# Patient Record
Sex: FEMALE | Race: WHITE
[De-identification: names, ages, dates, MRNs, and addresses within clinical notes are randomized per-mention and may not be internally consistent; named-entity substitution may affect disease eponyms.]

---

## 2020-01-02 ENCOUNTER — RX ONLY (RX ONLY)
Age: 60
End: 2020-01-02

## 2020-01-02 RX ORDER — FLUOCINOLONE ACETONIDE 0.11 MG/ML
OIL TOPICAL
Qty: 6 | Refills: 0 | Status: ERX | COMMUNITY
Start: 2020-01-02

## 2020-01-02 RX ORDER — FLUTICASONE PROPIONATE 0.5 MG/G
CREAM TOPICAL
Qty: 1 | Refills: 0 | Status: ERX | COMMUNITY
Start: 2020-01-02

## 2020-01-06 ENCOUNTER — RX ONLY (RX ONLY)
Age: 60
End: 2020-01-06

## 2020-01-06 RX ORDER — FLUOCINOLONE ACETONIDE 0.11 MG/ML
OIL TOPICAL
Qty: 6 | Refills: 0 | Status: ERX

## 2020-05-04 ENCOUNTER — RX ONLY (RX ONLY)
Age: 60
End: 2020-05-04

## 2020-05-04 RX ORDER — FLUOCINOLONE ACETONIDE 0.11 MG/ML
OIL TOPICAL
Qty: 1 | Refills: 2 | Status: ERX

## 2020-05-08 ENCOUNTER — RX ONLY (RX ONLY)
Age: 60
End: 2020-05-08

## 2020-05-08 RX ORDER — FLUOCINOLONE ACETONIDE 0.11 MG/ML
OIL TOPICAL
Qty: 1 | Refills: 1 | Status: ERX

## 2020-05-21 ENCOUNTER — APPOINTMENT (OUTPATIENT)
Dept: URBAN - METROPOLITAN AREA CLINIC 232 | Age: 60
Setting detail: DERMATOLOGY
End: 2020-05-21

## 2020-05-21 DIAGNOSIS — L40.0 PSORIASIS VULGARIS: ICD-10-CM

## 2020-05-21 DIAGNOSIS — L82.1 OTHER SEBORRHEIC KERATOSIS: ICD-10-CM

## 2020-05-21 PROCEDURE — OTHER COUNSELING: OTHER

## 2020-05-21 PROCEDURE — OTHER ADDITIONAL NOTES: OTHER

## 2020-05-21 PROCEDURE — 99203 OFFICE O/P NEW LOW 30 MIN: CPT

## 2020-05-21 PROCEDURE — OTHER PRESCRIPTION: OTHER

## 2020-05-21 RX ORDER — FLUOCINOLONE ACETONIDE 0.11 MG/ML
OIL TOPICAL
Qty: 1 | Refills: 3 | Status: ERX

## 2020-05-21 ASSESSMENT — LOCATION SIMPLE DESCRIPTION DERM: LOCATION SIMPLE: RIGHT UPPER BACK

## 2020-05-21 ASSESSMENT — LOCATION ZONE DERM: LOCATION ZONE: TRUNK

## 2020-05-21 ASSESSMENT — LOCATION DETAILED DESCRIPTION DERM: LOCATION DETAILED: RIGHT MID-UPPER BACK

## 2020-05-21 NOTE — PROCEDURE: ADDITIONAL NOTES
Detail Level: Simple
Additional Notes: MTX IS given and discussed.  She is aware she can call if she would like to pursue.  She has no obvious contraindication.

## 2020-10-23 ENCOUNTER — RX ONLY (RX ONLY)
Age: 60
End: 2020-10-23

## 2020-10-23 RX ORDER — FLUTICASONE PROPIONATE 0.5 MG/G
CREAM TOPICAL
Qty: 1 | Refills: 0 | Status: ERX

## 2021-06-14 ENCOUNTER — APPOINTMENT (OUTPATIENT)
Dept: URBAN - METROPOLITAN AREA CLINIC 232 | Age: 61
Setting detail: DERMATOLOGY
End: 2021-06-15

## 2021-06-14 DIAGNOSIS — L40.0 PSORIASIS VULGARIS: ICD-10-CM

## 2021-06-14 PROCEDURE — 99214 OFFICE O/P EST MOD 30 MIN: CPT

## 2021-06-14 PROCEDURE — OTHER COUNSELING: OTHER

## 2021-06-14 PROCEDURE — OTHER MIPS QUALITY: OTHER

## 2021-06-14 PROCEDURE — OTHER ORDER TESTS: OTHER

## 2021-06-14 PROCEDURE — OTHER PRESCRIPTION: OTHER

## 2021-06-14 PROCEDURE — OTHER PATIENT SPECIFIC COUNSELING: OTHER

## 2021-06-14 PROCEDURE — OTHER TREATMENT REGIMEN: OTHER

## 2021-06-14 PROCEDURE — OTHER METHOTREXATE INITIATION: OTHER

## 2021-06-14 RX ORDER — METHOTREXATE SODIUM 2.5 MG/1
TABLET ORAL
Qty: 12 | Refills: 3 | Status: ERX | COMMUNITY
Start: 2021-06-14

## 2021-06-14 RX ORDER — BETAMETHASONE DIPROPIONATE 0.5 MG/ML
LOTION, AUGMENTED TOPICAL
Qty: 3 | Refills: 3 | Status: ERX | COMMUNITY
Start: 2021-06-14

## 2021-06-14 RX ORDER — FLUOCINOLONE ACETONIDE 0.11 MG/ML
OIL TOPICAL
Qty: 6 | Refills: 3 | Status: ERX | COMMUNITY
Start: 2021-06-14

## 2021-06-14 RX ORDER — FOLIC ACID 1 MG/1
TABLET ORAL
Qty: 30 | Refills: 11 | Status: ERX | COMMUNITY
Start: 2021-06-14

## 2021-06-14 ASSESSMENT — PGA PSORIASIS: PGA PSORIASIS 2020: MODERATE

## 2021-06-14 ASSESSMENT — BSA PSORIASIS: % BODY COVERED IN PSORIASIS: 8

## 2021-06-14 NOTE — PROCEDURE: TREATMENT REGIMEN
Continue Regimen: fluocinolone 0.01 % topical body oil \\nApply to scalp trunk and extremities daily for two weeks
Action 4: Continue
Detail Level: Zone
Start Regimen: betamethasone, augmented 0.05 % lotion \\nApply to ears trunk and extremities bid for two weeks\\n\\nmethotrexate sodium 2.5 mg tablet \\nTake 3 tabs once a week\\n\\nfolic acid 1 mg tablet \\nTake one tab daily on days that methotrexate is not taken

## 2021-06-14 NOTE — PROCEDURE: METHOTREXATE INITIATION
Initial Cbc (Optional): within normal limits Oct 2020
Initial Lfts (Optional): 21/22 Oct 2020
Detail Level: Zone

## 2021-06-14 NOTE — PROCEDURE: PATIENT SPECIFIC COUNSELING
Detail Level: Detailed
Pt will be a bit more aggressive with topical steroids in the next 2 weeks. Methotrexate information sheet given and discussed. Pt is very frustrated with her psoriasis as is has not responded well to previous topical steroid regimen. She is significant pruritis. The plaques cover most of her scalp, ears and spillover on to her forehead. She has had patches on her eyelids and uses fluticasone prn. She denies any liver issues, she rarely drinks alcohol, no h/o cancer or kidney disease. Recent CMP and CBC were WNL. Will have pt get labs done after 4 doses of methotrexate. If WNL and no side effects, will increase dose to 6 tabs weekly. Pt seems to understand importance of Folic Acid use as well. Pt reports she has joint pain but unclear of actual joint swelling. She has not had eval by Rheum and not given a dx for PsA at this time. She will take note of joint pain status while on MTX and try to d/c voltaren if doing well.

## 2021-07-20 ENCOUNTER — APPOINTMENT (OUTPATIENT)
Dept: URBAN - METROPOLITAN AREA CLINIC 232 | Age: 61
Setting detail: DERMATOLOGY
End: 2021-07-21

## 2021-07-20 DIAGNOSIS — L40.0 PSORIASIS VULGARIS: ICD-10-CM

## 2021-07-20 PROCEDURE — OTHER ORDER TESTS: OTHER

## 2021-07-20 PROCEDURE — OTHER PRESCRIPTION: OTHER

## 2021-07-20 PROCEDURE — OTHER METHOTREXATE MONITORING: OTHER

## 2021-07-20 RX ORDER — METHOTREXATE SODIUM 2.5 MG/1
TABLET ORAL
Qty: 24 | Refills: 5 | Status: ERX | COMMUNITY
Start: 2021-07-20

## 2021-07-20 NOTE — PROCEDURE: METHOTREXATE MONITORING
Comments: Will increase MTX to 6 tabs (15mg) q week then check labs again in 1 month. Labslips for CBC and LFTs was mailed to pt's home address to get another draw in 1 month. She will follow up in 2 months.
Add High Risk Medication Management Associated Diagnosis?: Yes
Current Dosage (Optional): 7.5mg/week
Most Recent Cbc (Optional): WNL 7/12/21
Length Of Therapy (Optional): 1 month
Detail Level: Zone

## 2021-09-15 ENCOUNTER — APPOINTMENT (OUTPATIENT)
Dept: URBAN - METROPOLITAN AREA CLINIC 232 | Age: 61
Setting detail: DERMATOLOGY
End: 2021-09-15

## 2021-09-15 DIAGNOSIS — L40.0 PSORIASIS VULGARIS: ICD-10-CM

## 2021-09-15 PROCEDURE — OTHER METHOTREXATE MONITORING: OTHER

## 2021-09-15 PROCEDURE — 99213 OFFICE O/P EST LOW 20 MIN: CPT

## 2021-09-15 PROCEDURE — OTHER TREATMENT REGIMEN: OTHER

## 2021-09-15 PROCEDURE — OTHER PRESCRIPTION: OTHER

## 2021-09-15 PROCEDURE — OTHER ORDER TESTS: OTHER

## 2021-09-15 PROCEDURE — OTHER MIPS QUALITY: OTHER

## 2021-09-15 RX ORDER — METHOTREXATE SODIUM 2.5 MG/1
TABLET ORAL
Qty: 72 | Refills: 3 | Status: ERX

## 2021-09-15 ASSESSMENT — LOCATION ZONE DERM: LOCATION ZONE: SCALP

## 2021-09-15 ASSESSMENT — LOCATION SIMPLE DESCRIPTION DERM: LOCATION SIMPLE: SCALP

## 2021-09-15 ASSESSMENT — LOCATION DETAILED DESCRIPTION DERM: LOCATION DETAILED: LEFT CENTRAL FRONTAL SCALP

## 2021-09-15 NOTE — PROCEDURE: TREATMENT REGIMEN
Detail Level: Zone
Continue Regimen: methotrexate sodium 2.5 mg tablet (6tabs) once weekly, taper as tolerated to lowest dose without flaring and lowest side effects\\nFolic acid 1mg daily ( on days not taking methotrexate)\\nAug betamethasone lotion prn
Action 2: Continue
Other Instructions: Get labs done Dec or Ethan

## 2021-09-15 NOTE — PROCEDURE: METHOTREXATE MONITORING
Detail Level: Zone
Most Recent Cbc (Optional): WNL 8/24/21
Add High Risk Medication Management Associated Diagnosis?: Yes
Length Of Therapy (Optional): 3 month
Current Dosage (Optional): 15mg/week

## 2022-01-12 ENCOUNTER — APPOINTMENT (OUTPATIENT)
Dept: URBAN - METROPOLITAN AREA CLINIC 232 | Age: 62
Setting detail: DERMATOLOGY
End: 2022-01-13

## 2022-01-12 DIAGNOSIS — L40.0 PSORIASIS VULGARIS: ICD-10-CM

## 2022-01-12 PROCEDURE — OTHER ORDER TESTS: OTHER

## 2022-01-12 PROCEDURE — OTHER METHOTREXATE MONITORING: OTHER

## 2022-01-12 PROCEDURE — OTHER TREATMENT REGIMEN: OTHER

## 2022-01-12 ASSESSMENT — LOCATION DETAILED DESCRIPTION DERM: LOCATION DETAILED: LEFT CENTRAL FRONTAL SCALP

## 2022-01-12 ASSESSMENT — PGA PSORIASIS: PGA PSORIASIS 2020: ALMOST CLEAR

## 2022-01-12 ASSESSMENT — LOCATION ZONE DERM: LOCATION ZONE: SCALP

## 2022-01-12 ASSESSMENT — LOCATION SIMPLE DESCRIPTION DERM: LOCATION SIMPLE: SCALP

## 2022-01-12 NOTE — PROCEDURE: TREATMENT REGIMEN
Action 2: Continue
Other Instructions: Get labs done May and follow up early Leidy
Detail Level: Zone
Continue Regimen: methotrexate sodium 2.5 mg tablet (5 tabs) once weekly, taper as tolerated to lowest dose without flaring and lowest side effects\\nFolic acid 1mg daily ( on days not taking methotrexate)\\nAug betamethasone lotion prn

## 2022-01-12 NOTE — PROCEDURE: METHOTREXATE MONITORING
Detail Level: Zone
Most Recent Cbc (Optional): WNL 1/11/22
Current Dosage (Optional): 12.5mg/week
Add High Risk Medication Management Associated Diagnosis?: Yes
Length Of Therapy (Optional): 7 months

## 2022-06-21 ENCOUNTER — RX ONLY (RX ONLY)
Age: 62
End: 2022-06-21

## 2022-06-21 RX ORDER — FOLIC ACID 1 MG/1
TABLET ORAL
Qty: 90 | Refills: 3 | Status: ERX | COMMUNITY
Start: 2022-06-21

## 2022-09-06 ENCOUNTER — APPOINTMENT (OUTPATIENT)
Dept: URBAN - METROPOLITAN AREA CLINIC 232 | Age: 62
Setting detail: DERMATOLOGY
End: 2022-09-06

## 2022-09-06 DIAGNOSIS — L65.0 TELOGEN EFFLUVIUM: ICD-10-CM

## 2022-09-06 DIAGNOSIS — L40.0 PSORIASIS VULGARIS: ICD-10-CM

## 2022-09-06 PROCEDURE — OTHER ORDER TESTS: OTHER

## 2022-09-06 PROCEDURE — OTHER PRESCRIPTION: OTHER

## 2022-09-06 PROCEDURE — OTHER METHOTREXATE MONITORING: OTHER

## 2022-09-06 PROCEDURE — OTHER COUNSELING: OTHER

## 2022-09-06 PROCEDURE — 99213 OFFICE O/P EST LOW 20 MIN: CPT

## 2022-09-06 PROCEDURE — OTHER MIPS QUALITY: OTHER

## 2022-09-06 PROCEDURE — OTHER TREATMENT REGIMEN: OTHER

## 2022-09-06 PROCEDURE — OTHER ADDITIONAL NOTES: OTHER

## 2022-09-06 RX ORDER — FLUOCINOLONE ACETONIDE 0.11 MG/ML
OIL TOPICAL
Qty: 354.84 | Refills: 3 | Status: ERX | COMMUNITY
Start: 2022-09-06

## 2022-09-06 RX ORDER — METHOTREXATE SODIUM 2.5 MG/1
TABLET ORAL
Qty: 72 | Refills: 3 | Status: ERX

## 2022-09-06 RX ORDER — FOLIC ACID 1 MG/1
TABLET ORAL
Qty: 90 | Refills: 3 | Status: ERX

## 2022-09-06 RX ORDER — BETAMETHASONE DIPROPIONATE 0.5 MG/ML
LOTION, AUGMENTED TOPICAL
Qty: 180 | Refills: 3 | Status: ERX | COMMUNITY
Start: 2022-09-06

## 2022-09-06 ASSESSMENT — LOCATION DETAILED DESCRIPTION DERM
LOCATION DETAILED: MID-FRONTAL SCALP
LOCATION DETAILED: LEFT CENTRAL FRONTAL SCALP

## 2022-09-06 ASSESSMENT — LOCATION ZONE DERM: LOCATION ZONE: SCALP

## 2022-09-06 ASSESSMENT — PGA PSORIASIS: PGA PSORIASIS 2020: ALMOST CLEAR

## 2022-09-06 ASSESSMENT — LOCATION SIMPLE DESCRIPTION DERM
LOCATION SIMPLE: ANTERIOR SCALP
LOCATION SIMPLE: SCALP

## 2022-09-06 ASSESSMENT — BSA PSORIASIS: % BODY COVERED IN PSORIASIS: 1

## 2022-09-06 NOTE — PROCEDURE: METHOTREXATE MONITORING
Dosage 15 (Mg/Week): 0
Add Additional Dosage: No
Document Previous Cumulative Dosage (Historical Patients Only): No - New Methotrexate Patient
Detail Level: Zone
Current Dosage (Optional): 15mg/week
Comments: Pt reports she had some flaring so she went back to 6 tabs q week and doing well despite having big life stressors.
Add High Risk Medication Management Associated Diagnosis?: Yes
Calculate Cumulative Dosage Automatically?: No - Use Free Text
Most Recent Lfts (Optional): WNL 5/16/22

## 2022-09-06 NOTE — PROCEDURE: ADDITIONAL NOTES
Render Risk Assessment In Note?: no
Additional Notes: Pt admits to several life stressors such as death of a parent, ill parent where she is the main caregiver and a  with dementia and a caregiver for him as well.
Detail Level: Detailed

## 2022-09-06 NOTE — PROCEDURE: TREATMENT REGIMEN
Action 2: Continue
Detail Level: Zone
Continue Regimen: methotrexate sodium 2.5 mg tablet \\nTake 6 tabs once a week\\n\\nfolic acid 1 mg tablet \\nTake 1 pill daily on non MTX days\\n\\nfluocinolone 0.01 % scalp oil and shower cap \\nApply to scalp bid for 2 week intervals.\\n\\nbetamethasone, augmented 0.05 % lotion \\nApply to scalp bid for up to 2 week duration.

## 2022-09-23 ENCOUNTER — APPOINTMENT (OUTPATIENT)
Dept: URBAN - METROPOLITAN AREA CLINIC 232 | Age: 62
Setting detail: DERMATOLOGY
End: 2022-09-23

## 2022-09-23 DIAGNOSIS — L40.0 PSORIASIS VULGARIS: ICD-10-CM

## 2022-09-23 PROCEDURE — OTHER ORDER TESTS: OTHER

## 2022-09-23 PROCEDURE — OTHER METHOTREXATE MONITORING: OTHER

## 2022-09-23 PROCEDURE — OTHER MIPS QUALITY: OTHER

## 2022-09-23 PROCEDURE — OTHER TREATMENT REGIMEN: OTHER

## 2022-09-23 ASSESSMENT — LOCATION ZONE DERM: LOCATION ZONE: SCALP

## 2022-09-23 ASSESSMENT — LOCATION SIMPLE DESCRIPTION DERM: LOCATION SIMPLE: SCALP

## 2022-09-23 ASSESSMENT — LOCATION DETAILED DESCRIPTION DERM: LOCATION DETAILED: LEFT CENTRAL FRONTAL SCALP

## 2022-09-23 NOTE — PROCEDURE: TREATMENT REGIMEN
Action 2: Continue
Detail Level: Zone
Continue Regimen: methotrexate sodium 2.5 mg tablet \\nTake 6 tabs once a week\\n\\nfolic acid 1 mg tablet \\nTake 1 pill daily on non MTX days\\n\\nfluocinolone 0.01 % scalp oil and shower cap \\nApply to scalp bid for 2 week intervals.\\n\\nbetamethasone, augmented 0.05 % lotion \\nApply to scalp bid for up to 2 week duration.
Other Instructions: CBC and LFTs 9/22/22 were WNL. Pt has not had a CMP since 2020 according to [facility] portal so will do a CMP next draw.

## 2022-09-23 NOTE — PROCEDURE: METHOTREXATE MONITORING
Dosage 4 (Mg/Week): 0
Add Additional Dosage: No
Add High Risk Medication Management Associated Diagnosis?: Yes
Detail Level: Zone
Current Dosage (Optional): 15mg/week
Comments: Pt reports she had some flaring so she went back to 6 tabs q week and doing well despite having big life stressors.
Calculate Cumulative Dosage Automatically?: No - Use Free Text
Most Recent Cbc (Optional): WNL 9/22/22
Document Previous Cumulative Dosage (Historical Patients Only): No - New Methotrexate Patient

## 2023-02-02 ENCOUNTER — APPOINTMENT (OUTPATIENT)
Dept: URBAN - METROPOLITAN AREA CLINIC 232 | Age: 63
Setting detail: DERMATOLOGY
End: 2023-02-03

## 2023-02-02 DIAGNOSIS — L40.0 PSORIASIS VULGARIS: ICD-10-CM

## 2023-02-02 PROCEDURE — OTHER ORDER TESTS: OTHER

## 2023-02-02 PROCEDURE — OTHER METHOTREXATE MONITORING: OTHER

## 2023-02-02 ASSESSMENT — LOCATION ZONE DERM: LOCATION ZONE: SCALP

## 2023-02-02 ASSESSMENT — LOCATION SIMPLE DESCRIPTION DERM: LOCATION SIMPLE: SCALP

## 2023-02-02 ASSESSMENT — LOCATION DETAILED DESCRIPTION DERM: LOCATION DETAILED: LEFT CENTRAL FRONTAL SCALP

## 2023-02-02 NOTE — PROCEDURE: METHOTREXATE MONITORING
Dosage 8 (Mg/Week): 0
Add Additional Dosage: No
Add High Risk Medication Management Associated Diagnosis?: Yes
Detail Level: Zone
Most Recent Cbc (Optional): WNL 9/22/22
Comments: Pt has slightly decreased GFR. She will discussed this with her PCP at her annual visit. In light of this, it is in her best interest to keep her MTX to the lowest she can remain clear/near clear. Pt reports she thinks she can decrease to 5 tabs q week and will start lower dose this week.If unable to do so, will consider alternative options such a biologics. Recheck labs in 3-4 months
Length Of Therapy (Optional): started 6/2021
Calculate Cumulative Dosage Automatically?: No - Use Free Text
Document Previous Cumulative Dosage (Historical Patients Only): No - New Methotrexate Patient
Current Dosage (Optional): 15mg/week

## 2023-04-05 ENCOUNTER — RX ONLY (RX ONLY)
Age: 63
End: 2023-04-05

## 2023-04-05 RX ORDER — FOLIC ACID 1 MG/1
TABLET ORAL
Qty: 90 | Refills: 3 | Status: ERX | COMMUNITY
Start: 2023-04-05

## 2023-09-06 ENCOUNTER — APPOINTMENT (OUTPATIENT)
Dept: URBAN - METROPOLITAN AREA CLINIC 232 | Age: 63
Setting detail: DERMATOLOGY
End: 2023-09-07

## 2023-09-06 VITALS — WEIGHT: 225 LBS | HEIGHT: 55 IN

## 2023-09-06 DIAGNOSIS — L40.0 PSORIASIS VULGARIS: ICD-10-CM

## 2023-09-06 PROCEDURE — OTHER COUNSELING: OTHER

## 2023-09-06 PROCEDURE — OTHER ORDER TESTS: OTHER

## 2023-09-06 PROCEDURE — OTHER MIPS QUALITY: OTHER

## 2023-09-06 PROCEDURE — OTHER METHOTREXATE MONITORING: OTHER

## 2023-09-06 PROCEDURE — 99213 OFFICE O/P EST LOW 20 MIN: CPT

## 2023-09-06 PROCEDURE — OTHER TREATMENT REGIMEN: OTHER

## 2023-09-06 PROCEDURE — OTHER PRESCRIPTION: OTHER

## 2023-09-06 RX ORDER — FOLIC ACID 1 MG/1
TABLET ORAL
Qty: 90 | Refills: 3 | Status: ERX

## 2023-09-06 RX ORDER — METHOTREXATE SODIUM 2.5 MG/1
TABLET ORAL
Qty: 72 | Refills: 3 | Status: ERX

## 2023-09-06 ASSESSMENT — BSA PSORIASIS: % BODY COVERED IN PSORIASIS: 1

## 2023-09-06 ASSESSMENT — ITCH NUMERIC RATING SCALE: HOW SEVERE IS YOUR ITCHING?: 4

## 2023-09-06 ASSESSMENT — LOCATION DETAILED DESCRIPTION DERM: LOCATION DETAILED: LEFT CENTRAL FRONTAL SCALP

## 2023-09-06 ASSESSMENT — LOCATION SIMPLE DESCRIPTION DERM: LOCATION SIMPLE: SCALP

## 2023-09-06 ASSESSMENT — PGA PSORIASIS: PGA PSORIASIS 2020: ALMOST CLEAR

## 2023-09-06 ASSESSMENT — LOCATION ZONE DERM: LOCATION ZONE: SCALP

## 2023-09-06 NOTE — PROCEDURE: TREATMENT REGIMEN
Continue Regimen: methotrexate sodium 2.5 mg tablet \\nTake 5 tabs once a week (rx is written for 6 tabs q wk in case pt has any flaring)\\n\\nfolic acid 1 mg tablet \\nTake 1 pill daily on non MTX days\\n\\nfluocinolone 0.01 % scalp oil and shower cap \\nApply to scalp bid for 2 week intervals prn\\n\\nbetamethasone, augmented 0.05 % lotion \\nApply to scalp bid for up to 2 week duration prn

## 2023-09-06 NOTE — PROCEDURE: TREATMENT REGIMEN
Plan: July lab results uploaded to attachments. CBC/LFTs WNL but GFR slightly low. No need to decrease MTX dose at this time. Lab work is due in Jan 2024 Other Instructions: July lab results uploaded to attachments. CBC/LFTs WNL but GFR slightly low. No need to decrease MTX dose at this time. Lab work is due in Jan 2024

## 2023-09-06 NOTE — PROCEDURE: METHOTREXATE MONITORING
Dosage 15 (Mg/Week): 0
Add Additional Dosage: No
Document Previous Cumulative Dosage (Historical Patients Only): No - New Methotrexate Patient
Detail Level: Zone
Current Dosage (Optional): 15mg/week
Add High Risk Medication Management Associated Diagnosis?: Yes
Calculate Cumulative Dosage Automatically?: No - Use Free Text
Length Of Therapy (Optional): Started 6/2021
Most Recent Lfts (Optional): WNL 7/27/23

## 2023-11-06 RX ORDER — FOLIC ACID 1 MG/1
TABLET ORAL
Qty: 90 | Refills: 3 | Status: ERX

## 2024-01-30 ENCOUNTER — APPOINTMENT (OUTPATIENT)
Dept: URBAN - METROPOLITAN AREA CLINIC 232 | Age: 64
Setting detail: DERMATOLOGY
End: 2024-01-30

## 2024-01-30 DIAGNOSIS — L40.0 PSORIASIS VULGARIS: ICD-10-CM

## 2024-01-30 PROCEDURE — OTHER METHOTREXATE MONITORING: OTHER

## 2024-01-30 PROCEDURE — OTHER ORDER TESTS: OTHER

## 2024-01-30 PROCEDURE — OTHER COUNSELING: OTHER

## 2024-01-30 PROCEDURE — OTHER TREATMENT REGIMEN: OTHER

## 2024-01-30 ASSESSMENT — LOCATION SIMPLE DESCRIPTION DERM: LOCATION SIMPLE: SCALP

## 2024-01-30 ASSESSMENT — LOCATION ZONE DERM: LOCATION ZONE: SCALP

## 2024-01-30 ASSESSMENT — LOCATION DETAILED DESCRIPTION DERM: LOCATION DETAILED: LEFT CENTRAL FRONTAL SCALP

## 2024-01-30 NOTE — PROCEDURE: TREATMENT REGIMEN
Action 2: Continue
Detail Level: Zone
Continue Regimen: methotrexate sodium 2.5 mg tablet \\nTake 5 tabs once a week (rx is written for 6 tabs q wk in case pt has any flaring)\\n\\nfolic acid 1 mg tablet \\nTake 1 pill daily on non MTX days\\n\\nfluocinolone 0.01 % scalp oil and shower cap \\nApply to scalp bid for 2 week intervals prn\\n\\nbetamethasone, augmented 0.05 % lotion \\nApply to scalp bid for up to 2 week duration prn
Plan: Jan 2024 CBC/LFT WNL.  Lab work is due in May 2024

## 2024-01-30 NOTE — PROCEDURE: METHOTREXATE MONITORING
Dosage 15 (Mg/Week): 0
Add Additional Dosage: No
Document Previous Cumulative Dosage (Historical Patients Only): No - New Methotrexate Patient
Detail Level: Zone
Current Dosage: 15mg/week
Add High Risk Medication Management Associated Diagnosis?: Yes
Calculate Cumulative Dosage Automatically?: No - Use Free Text
Length Of Therapy (Optional): Started 6/2021
Most Recent Lfts (Optional): WNL 1/2024

## 2024-05-16 ENCOUNTER — APPOINTMENT (OUTPATIENT)
Dept: URBAN - METROPOLITAN AREA CLINIC 232 | Age: 64
Setting detail: DERMATOLOGY
End: 2024-05-17

## 2024-05-16 DIAGNOSIS — L40.0 PSORIASIS VULGARIS: ICD-10-CM

## 2024-05-16 PROCEDURE — OTHER METHOTREXATE MONITORING: OTHER

## 2024-05-16 PROCEDURE — OTHER ORDER TESTS: OTHER

## 2024-05-16 PROCEDURE — OTHER TREATMENT REGIMEN: OTHER

## 2024-05-16 PROCEDURE — OTHER COUNSELING: OTHER

## 2024-05-16 ASSESSMENT — LOCATION ZONE DERM: LOCATION ZONE: SCALP

## 2024-05-16 ASSESSMENT — LOCATION DETAILED DESCRIPTION DERM: LOCATION DETAILED: LEFT CENTRAL FRONTAL SCALP

## 2024-05-16 ASSESSMENT — LOCATION SIMPLE DESCRIPTION DERM: LOCATION SIMPLE: SCALP

## 2024-05-16 NOTE — PROCEDURE: TREATMENT REGIMEN
Action 2: Continue
Detail Level: Zone
Continue Regimen: methotrexate sodium 2.5 mg tablet \\nTake 5 tabs once a week (rx is written for 6 tabs q wk in case pt has any flaring)\\n\\nfolic acid 1 mg tablet \\nTake 1 pill daily on non MTX days\\n\\nfluocinolone 0.01 % scalp oil and shower cap \\nApply to scalp bid for 2 week intervals prn\\n\\nbetamethasone, augmented 0.05 % lotion \\nApply to scalp bid for up to 2 week duration prn
Plan: May 2024 CBC/LFT WNL.  Lab work is due in Aug/Sept 2024. Pt has f/u 9/11/24 so will get labs done before that appmt. No refills needed at this time.

## 2024-05-16 NOTE — PROCEDURE: METHOTREXATE MONITORING
Dosage 15 (Mg/Week): 0
Add Additional Dosage: No
Document Previous Cumulative Dosage (Historical Patients Only): No - New Methotrexate Patient
Detail Level: Zone
Current Dosage: 15mg/week
Add High Risk Medication Management Associated Diagnosis?: Yes
Calculate Cumulative Dosage Automatically?: No - Use Free Text
Length Of Therapy (Optional): Started 6/2021
Most Recent Lfts (Optional): WNL 5/2024

## 2024-09-11 ENCOUNTER — APPOINTMENT (OUTPATIENT)
Dept: URBAN - METROPOLITAN AREA CLINIC 232 | Age: 64
Setting detail: DERMATOLOGY
End: 2024-09-12

## 2024-09-11 DIAGNOSIS — L40.0 PSORIASIS VULGARIS: ICD-10-CM

## 2024-09-11 PROCEDURE — OTHER TREATMENT REGIMEN: OTHER

## 2024-09-11 PROCEDURE — OTHER MIPS QUALITY: OTHER

## 2024-09-11 PROCEDURE — OTHER ORDER TESTS: OTHER

## 2024-09-11 PROCEDURE — OTHER PRESCRIPTION: OTHER

## 2024-09-11 PROCEDURE — OTHER METHOTREXATE MONITORING: OTHER

## 2024-09-11 PROCEDURE — 99213 OFFICE O/P EST LOW 20 MIN: CPT

## 2024-09-11 PROCEDURE — OTHER COUNSELING: OTHER

## 2024-09-11 RX ORDER — FLUOCINOLONE ACETONIDE 0.11 MG/ML
OIL TOPICAL
Qty: 354.84 | Refills: 3 | Status: ERX | COMMUNITY
Start: 2024-09-11

## 2024-09-11 RX ORDER — BETAMETHASONE DIPROPIONATE 0.5 MG/ML
LOTION, AUGMENTED TOPICAL
Qty: 180 | Refills: 3 | Status: ERX

## 2024-09-11 RX ORDER — FOLIC ACID 1 MG/1
TABLET ORAL
Qty: 90 | Refills: 3 | Status: ERX

## 2024-09-11 RX ORDER — METHOTREXATE SODIUM 2.5 MG/1
TABLET ORAL QWEEK
Qty: 72 | Refills: 3 | Status: ERX

## 2024-09-11 ASSESSMENT — PGA PSORIASIS: PGA PSORIASIS 2020: CLEAR

## 2024-09-11 ASSESSMENT — LOCATION DETAILED DESCRIPTION DERM: LOCATION DETAILED: LEFT CENTRAL FRONTAL SCALP

## 2024-09-11 ASSESSMENT — LOCATION ZONE DERM: LOCATION ZONE: SCALP

## 2024-09-11 ASSESSMENT — LOCATION SIMPLE DESCRIPTION DERM: LOCATION SIMPLE: SCALP

## 2024-09-11 ASSESSMENT — BSA PSORIASIS: % BODY COVERED IN PSORIASIS: 0

## 2024-09-11 ASSESSMENT — ITCH NUMERIC RATING SCALE: HOW SEVERE IS YOUR ITCHING?: 0

## 2024-09-11 NOTE — PROCEDURE: METHOTREXATE MONITORING
Dosage 15 (Mg/Week): 0
Add Additional Dosage: No
Document Previous Cumulative Dosage (Historical Patients Only): No - New Methotrexate Patient
Detail Level: Zone
Current Dosage: 10mg/week
Add High Risk Medication Management Associated Diagnosis?: Yes
Calculate Cumulative Dosage Automatically?: No - Use Free Text
Length Of Therapy (Optional): Started 6/2021
Most Recent Lfts (Optional): WNL 9/3/2024

## 2024-09-11 NOTE — PROCEDURE: TREATMENT REGIMEN
Action 2: Continue
Detail Level: Zone
Continue Regimen: methotrexate sodium 2.5 mg tablet \\nTake 5 tabs once a week (rx is written for 6 tabs q wk in case pt has any flaring) Pt will try to decrease to 4 MTX a week\\n\\nfolic acid 1 mg tablet \\nTake 1 pill daily on non MTX days\\n\\nfluocinolone 0.01 % scalp oil and shower cap \\nApply to scalp bid for 2 week intervals prn\\n\\nbetamethasone, augmented 0.05 % lotion \\nApply to scalp bid for up to 2 week duration prn
Plan: Labslip given today and pt to get labs done again Dec or Jan

## 2024-09-26 ENCOUNTER — RX ONLY (RX ONLY)
Age: 64
End: 2024-09-26

## 2024-09-26 RX ORDER — METHOTREXATE SODIUM 2.5 MG/1
TABLET ORAL QWEEK
Qty: 72 | Refills: 3

## 2025-03-04 ENCOUNTER — APPOINTMENT (OUTPATIENT)
Dept: URBAN - METROPOLITAN AREA CLINIC 232 | Age: 65
Setting detail: DERMATOLOGY
End: 2025-03-05

## 2025-03-04 DIAGNOSIS — L40.0 PSORIASIS VULGARIS: ICD-10-CM

## 2025-03-04 PROCEDURE — OTHER TREATMENT REGIMEN: OTHER

## 2025-03-04 PROCEDURE — OTHER METHOTREXATE MONITORING: OTHER

## 2025-03-04 PROCEDURE — OTHER COUNSELING: OTHER

## 2025-03-04 PROCEDURE — OTHER ORDER TESTS: OTHER

## 2025-03-04 ASSESSMENT — LOCATION ZONE DERM: LOCATION ZONE: SCALP

## 2025-03-04 ASSESSMENT — LOCATION SIMPLE DESCRIPTION DERM: LOCATION SIMPLE: SCALP

## 2025-03-04 ASSESSMENT — LOCATION DETAILED DESCRIPTION DERM: LOCATION DETAILED: LEFT CENTRAL FRONTAL SCALP

## 2025-03-04 NOTE — PROCEDURE: METHOTREXATE MONITORING
Dosage 15 (Mg/Week): 0
Add Additional Dosage: No
Document Previous Cumulative Dosage (Historical Patients Only): No - New Methotrexate Patient
Detail Level: Zone
Current Dosage: 10mg/week
Add High Risk Medication Management Associated Diagnosis?: Yes
Calculate Cumulative Dosage Automatically?: No - Use Free Text
Length Of Therapy (Optional): Started 6/2021
Most Recent Lfts (Optional): WNL 2/28/25

## 2025-03-04 NOTE — PROCEDURE: TREATMENT REGIMEN
Action 2: Continue
Detail Level: Zone
Continue Regimen: methotrexate sodium 2.5 mg tablet \\nTake 5 tabs once a week (rx is written for 6 tabs q wk in case pt has any flaring) \\n\\nfolic acid 1 mg tablet \\nTake 1 pill daily on non MTX days\\n\\nfluocinolone 0.01 % scalp oil and shower cap \\nApply to scalp bid for 2 week intervals prn\\n\\nbetamethasone, augmented 0.05 % lotion \\nApply to scalp bid for up to 2 week duration prn
Plan: Lab slip mailed to home address and due May/June

## 2025-03-04 NOTE — PROCEDURE: ORDER TESTS
Bill For Surgical Tray: no
Performing Laboratory: 0
Billing Type: Third-Party Bill
Expected Date Of Service: 03/04/2025